# Patient Record
Sex: FEMALE | Race: WHITE
[De-identification: names, ages, dates, MRNs, and addresses within clinical notes are randomized per-mention and may not be internally consistent; named-entity substitution may affect disease eponyms.]

---

## 2019-06-18 ENCOUNTER — HOSPITAL ENCOUNTER (OUTPATIENT)
Dept: HOSPITAL 88 - OR | Age: 74
Discharge: HOME | End: 2019-06-18
Attending: OPHTHALMOLOGY
Payer: MEDICARE

## 2019-06-18 VITALS — DIASTOLIC BLOOD PRESSURE: 63 MMHG | SYSTOLIC BLOOD PRESSURE: 119 MMHG

## 2019-06-18 DIAGNOSIS — I10: ICD-10-CM

## 2019-06-18 DIAGNOSIS — R00.1: ICD-10-CM

## 2019-06-18 DIAGNOSIS — R73.9: ICD-10-CM

## 2019-06-18 DIAGNOSIS — E78.5: ICD-10-CM

## 2019-06-18 DIAGNOSIS — Z01.810: ICD-10-CM

## 2019-06-18 DIAGNOSIS — H25.11: Primary | ICD-10-CM

## 2019-06-18 PROCEDURE — 66984 XCAPSL CTRC RMVL W/O ECP: CPT

## 2019-06-18 PROCEDURE — 93005 ELECTROCARDIOGRAM TRACING: CPT

## 2019-06-18 NOTE — XMS REPORT
Continuity of Care Document

                             Created on: 2017



TRICIA BEAUCHAMP

External Reference #: [id number]

: 1945

Sex: Female



Demographics







                          Address                   [id number] [place] [address] [place]  [address]

 

                          Home Phone                [phone]

 

                          Preferred Language        Unknown

 

                          Marital Status            Unknown

 

                          Yazdanism Affiliation     Unknown

 

                          Race                      Unknown

 

                          Ethnic Group              Unknown





Author







                          Author                    [facility]              Interface

 

                          Address                   Unknown

 

                          Phone                     Unavailable



                                                    



Problems

                    





                    Problem                            Status                            Onset Date     

                          Classification                            Date Reported       

                          Comments                            Source                    

 

                          Fluid level behind tympanic membrane                                              

                    2017                            Diagnosis                            2017

                                                        [facility]                   

 

 

                    Pharyngitis                                                                         

                    Problem                            2017                               

                                        [facility]                    



                                                                                
                       



Medications

                    





                    Medication                            Details                            Route      

                          Status                            Patient Instructions         

                          Ordering Provider                            Order Date           

                                        Source                    

 

                          Losartan Potassium 50 MG Oral Tablet                            losartan 50 mg tablet

                                                        Active                       

                                                                                                    

[facility]                    

 

                          Prednisone 20 MG Oral Tablet                            prednisone 20 mg tablet Take

 2 tablets every day by oral route with meals for 5 days.                       
                                                Active                                                 

                                                                            [facility]              

      

 

                                        24 HR metoprolol succinate 50 MG Extended Release Oral Tablet [Toprol]          

                          Toprol XL 50 mg tablet,extended release                            

                            Active                                                   

                                                                            [facility]                

    

 

                          Losartan Potassium 100 MG Oral Tablet                            losartan 100 mg tablet

                                                        Active                       

                                                                                                    

[facility]                    

 

                          tizanidine 4 MG Oral Tablet                            tizanidine 4 mg tablet     

                                                   Active                              

                                                                                  [facility]

                    

 

                          tramadol hydrochloride 50 MG Oral Tablet                            tramadol 50 mg

 tablet                                                        Active                

                                                                                               

                                        [facility]                    

 

                          Azithromycin 250 MG Oral Tablet                            Zithromax Z-Mehrdad 250 mg 

tablet TAKE 2 TABLETS (500 MG) BY ORAL ROUTE ONCE DAILY FOR 1 DAY THEN 1 TABLET 
(250 MG) BY ORAL ROUTE ONCE DAILY FOR 4 DAYS                                       

                    Active                                                                

                                                      [facility]                    



                                                                                
                                                                                
                        



Allergies, Adverse Reactions, Alerts

                    





                    Substance                            Category                            Reaction   

                          Severity                            Reaction type           

                          Status                            Date Reported                     

                          Comments                            Source                    



                                                                



Immunizations

                    





                    Immunization                            Date Given                            Site  

                          Status                            Last Updated             

                          Comments                            Source                    

 

                          influenza, unspecified formulation                            2016          

                                                completed                                

                                                      [facility]                    

 

                          pneumococcal conjugate PCV 13                            2016               

                                                completed                                     

                                                      [facility]                    

 

                    zoster                            2012                                        

                    completed                                                              

                                        [facility]                    



                                                                                
                                        



Results

                    





                    Order Name                            Results                            Value      

                          Reference Range                            Date                

                          Interpretation                            Comments                       

                                        Source                    

 

                                                RESULT                            negative              

                                                2016                                 

                                                      [facility]                    

 

                                                SWAB LOCATION                            Left and Right 

tonsillar pillars                                                          2016

                                                                                    [facility]

                    



                                                                                
                                



Vital Signs

                    





                    Vital Sign                            Value                            Date         

                          Comments                            Source                    

 

                    Diastolic (mm Hg)                            78                             2017

                                                        [facility]                   

 

 

                    Height                            66                             2017         

                                                      [facility]                    

 

                    Systolic (mm Hg)                            122                             2017

                                                        [facility]                   

 

 

                    Weight                            170                             2017        

                                                      [facility]                    

 

                    Diastolic (mm Hg)                            76                             2016

                                                        [facility]                   

 

 

                    Height                            66                             2016         

                                                      [facility]                    

 

                    Systolic (mm Hg)                            122                             2016

                                                        [facility]                   

 

 

                    Weight                            170                             2016        

                                                      [facility]                    



                                                                                
                                                                                
                                        



Encounters

                    





                    Location                            Location Details                            Encounter

 Type                            Encounter Number                            Reason For

 Visit                            Attending Provider                            ADM Date

                            DC Date                            Status                

                                        Source                    

 

                          [place] - [facility] - [id number] [place]: [address], [place] [address], Ph. [phone]                                    [id number]               

                                                Reema Anthony                             2016

                                                                                    [facility]

                    

 

                          [place] - [facility] - [id number]                                                

                                        Traci Zapata, ELIASP: [address], [place] [address], Ph. [phone]

                                        [id number]                   

                                                Traci Zapata                             2017  

                                                                                  [facility]

                    



                                                                                
                



Procedures

                    





                    Procedure                            Code                            Date           

                          Perfomer                            Comments                        

                                        Source

## 2019-06-18 NOTE — XMS REPORT
Summary of Care

                             Created on: 2019



TRICIA BEAUCHAMP

External Reference #: [id number]

: 1945

Sex: Female



Demographics







                          Address                   [address] [place] [address] [place]  [address]

 

                          Preferred Language        English

 

                          Marital Status            Unknown

 

                          Zoroastrian Affiliation     Unknown

 

                          Race                      White

 

                          Ethnic Group              Non-





Author







                          Author                    PRIETO MCFARLAND M.D.

 

                          Organization              Unknown

 

                          Address                   Unknown

 

                          Phone                     Unavailable







Care Team Providers







                    Care Team Member Name    Role                Phone

 

                    PRIETO MCFARLAND M.D.    Unavailable         Unavailable

 

                    ROQUE LOGAN M.D.    Unavailable         Unavailable

 

                    MARY BORRERO M.D.    Unavailable         Unavailable

 

                    NAHOMI BIGGS MD    Unavailable         Unavailable

 

                          Unavailable               Unavailable







Functional Status







                    Name                Dates               Details

 

                                        Functional status health issues are not documented

                                                    Status: 









                    Name                Dates               Details

 

                                        Cognitive status health issues are not documented

                                                    Status: 







Problems







                    Name                Dates               Details

 

                                        Essential (primary) hypertension (401.9, I10) 

                                                    Status: Active

 

                                        Hyperlipidemia (272.4, E78.5) 

                                                    Status: Active







Medications







                    Name                Dates               Details

 

                                        Co Q 10 100 MG Oral Capsule

qd



 









Active

Fish Oil 1000 MG Oral Capsule

TAKE 1 CAPSULE DAILY.

* 





                                         Refills: 0







Active

Vitamin C 1000 MG Oral Tablet

TAKE 1 TABLET DAILY.

* 





                                         Refills: 0







Active

Calcium 600 MG Oral Tablet

TAKE 1 TABLET DAILY.

* 





                                         Refills: 0







Active

Multivitamins TABS

TAKE 1 TABLET DAILY.

* 





                                         Refills: 0







Active

Toprol XL 50 MG Oral Tablet Extended Release 24 Hour

TAKE 1 TABLET BEDTIME

* 





                           Quantity: 90              Refills: 2









PRIETO MCFARLAND M.D. * 





                                         Start : 18-Aug-2014





Active

Losartan Potassium 50 MG Oral Tablet

Take 1 tablet by mouth twice a day

* 





                           Quantity: 180             Refills: 0









PRIETO MCFARLAND M.D. * 





                                         Start : 18-Mar-2019





Active

Nitrostat 0.4 MG Sublingual Tablet Sublingual

DISSOLVE 1 TABLET UNDER THE TONGUE AS NEEDED FOR CHEST PAIN.

* 





                           Quantity: 25              Refills: 1









MARY BORRERO M.D. * 





                                         Start : 16-Oct-2014





Active

Simvastatin 20 MG Oral Tablet

TAKE 1 TABLET AT BEDTIME

* 





                           Quantity: 90              Refills: 1









ROQUE LOGAN M.D. * 





                                         Start : 2019





Active

Aspirin 81 MG Oral Tablet Delayed Release

TAKE 1 TABLET BY MOUTH EVERY DAY

* 





                           Quantity: 30              Refills: 0









ROQUE LOGAN M.D. * 





                                         Start : 2-May-2018





Active

Colestipol HCl - 1 GM Oral Tablet

Micronized ) TAKE 2 TABLET TWICE DAILY.

* 





                           Quantity: 360             Refills: 0









PRIETO MCFARLAND M.D. * 





                                         Start : 5-Sep-2018





Active





Allergies and Adverse Reactions







                    Name                Dates               Details

 

                    No Known Drug Allergies (Allergy)                        Status: Active









Past Medical History







                    Name                Dates               Details

 

                                        History of essential hypertension (V12.59, Z86.79) 

                                                    Status: Resolved

 

                                        History of hyperlipidemia (V12.29, Z86.39) 

                                                    Status: Resolved

 

                                        History of Intercostal pain (786.59, R07.82) 

                                                    Status: Resolved







Procedures







                    Procedure           Dates               Details

 

                    History of Hysterectomy                        Completed 



 

                    History of Cholecystectomy                        Completed 









Immunization







                    Name                Dates               Details

 

                                        Immunizations not documented

                                                     







Family History







                    Name                Dates               Details

 

                                        Family history of Hypertension (V17.49) 

                                                    Comments: Family History

Status: Active









                    Name                Dates               Details

 

                                        Family history of Diabetes Mellitus (V18.0) 

                                                    Status: Active







Social History







                    Name                Dates               Details

 

                                        -

                                                    Status: 









                    Name                Dates               Details

 

                    Never smoker                             







Vital Signs







                Date            Test            Result          Details

 

                                                                 

 

                                        1-May-201910:49

                    BP Systolic         133 mm[Hg]          Status: Comments: Location: LUE; Position: Sitting



 

                    BP Diastolic        79 mm[Hg]           Status: Comments: Location: LUE; Position: Sitting



 

                    Height              60 in               Status: 



 

                    Weight              175 lb              Status: 



 

                    Body Mass Index Calculated    34.18 kg/m2         Status: 



 

                    Body Surface Area Calculated    1.76 m2             Status: 



 

                    Heart Rate          57 /min             Status: 









Results







                Date            Description     Value           Details

 

                    Results not documented                         

 

                                                                 







Plan of Care







                    Name                Dates               Details

 

                                        Planned Observations 

 

                    Planned Goals not documented                         

 

                                        Planned Encounters 

 

                                        Appointment; PRIETO MCFARLAND M.D.

                                        On: 10-Oct-2019 9:30









Interventions Provided

Plan* 1. Hypertension 

* - Continue Toprol 50mg daily. Continue Losartan 50mg HS --> BID w/ improved 
  control  

* 2. Hyperlipidemia 

* - Continue cholestyramine, diet and exercise 

* -  mg/dl as  

* -  mg /dl as  --> - up on Zocor 20 mg  

* - LDL 82 ,g/dl as 4.9 on Zocor 20 + colestipol  

* 3.DM 

* - 1ac 6.1% as  

* 4. Follow up in 6 months, regular

Discussion/Summary* Clinical cardiac findings reviewed and discussed 

* EKG reviewed and discussed





Instructions







                    Name                Dates               Details

 

                                        Instructions not documented

                                                     







Encounters







                                        Appointment; PRIETO MCFARLAND M.D. 

Encounter Diagnosis: Problem not documented

                                        On: 16-Oct-2017 9:30



 

                                        Appointment; PRIETO MCFARLAND M.D. 

Encounter Diagnosis: Problem not documented

                                        On: 24-Oct-2017 9:30



 

                                        Appointment; PRIETO MCFARLAND M.D. 

Encounter Diagnosis: Problem not documented

                                        On: 2-May-2018 13:30



 

                                        Appointment; PRIETO MCFARLAND M.D. 

Encounter Diagnosis: Problem not documented

                                        On: 30-Oct-2018 10:30



 

                                        Appointment; PRIETO MCFARLAND M.D. 

Encounter Diagnosis: Problem not documented

                                        On: 1-May-2019 10:30

## 2019-06-18 NOTE — XMS REPORT
Encounter Summary

                             Created on: 2017



Monika Beckett

External Reference #: [id number]

: 1945

Sex: Female



Demographics







                          Address                   [address] [place]  [address]

 

                          Home Phone                [phone]

 

                          Preferred Language        Unknown

 

                          Marital Status            

 

                          Gnosticism Affiliation     Unknown

 

                          Race                      Unknown

 

                          Ethnic Group              Unknown





Author







                          Organization              Unknown

 

                          Address                   [address] [place]  [address]



 

                          Phone                     [phone]



                                                      



Reason for Visit

                      





                                        Medical Complaint                



                                                                              



Instructions

          





                                        1. Fluid level behind tympanic membrane

 

                                         prednisone 20 mg tablet







Discussion Note: None recorded.



Patient educational handouts: No information available.                         
                                                    



Plan of Care

                      





                Reminders                                                                    Provider 

               

 

                Appointments    None recorded.                   

 

                Lab             None recorded.                   

 

                Referral        None recorded.                   

 

                Procedures      None recorded.                   

 

                Surgeries       None recorded.                   

 

                Imaging         None recorded.                   



                                                                              



Medications

                      





                    Name                      Start Date                                      

 

                          losartan 50 mg tablet     

 

                                        prednisone 20 mg tablet

 Take 2 tablets every day by oral route with meals for 5 days.    

 

                          Toprol XL 50 mg tablet,extended release    



                                                                                
                           



Medications Administered

          



  None recorded.                                                                
               



Vitals

          





                Height          Weight          BMI             Blood Pressure 

 

                 5 ft 6 in        170 lbs         27.4            122/78  



                                                                              



Lab Results

                      



              None recorded.                                                    
                                               



Allergies

          





           Code       Code System    Name       Reaction    Severity    Onset

 

                NKDA                                          



                                                                              



Problems

                      





                Name                      Status                      Onset Date                      

Source                                                  

 

                Pharyngitis     Active                         Encounter



                                                                                
       



Procedures

          



None recorded.                                                                  
           



Vaccine List

          





                                        Vaccine Type

 

                                        influenza, unspecified formulation

 

                                        10/31/2016

 

                                        pneumococcal conjugate PCV 13

 

                                        2015

 

                                        zoster

 

                                        2011



                                                                                
                 



Social History

          





                    Smoking Status      Never Smoker         



                                                                              



Past Encounters

                      





                                        2017

Fluid Level behind Tympanic Membrane

Traci Zapata, FNP: [address], [place] [address], Ph. [phone]



                                                                                
       



History of Present Illness

          



Note:72y/o female here with muffled ear to left for 2 weeks. Denies ear pain. No
associated sx. Has tried claritin.                                              
                     



Review of Systems

                      





                                                   Basic

 

                          Reported By:              Patient

 

                          Constitutional:           Constitutional: no fever

 

                          Eyes:                     Eyes: no eye complaints

 

                          Ears-Nose-Mouth-Throat:    Ears: difficulty hearing. Nose: no nose/sinus problems.

 Mouth/Throat: no sore throat, no bleeding gums, no mouth complaints, no teeth 
problems

 

                          Cardiovascular:           Cardiovascular: no chest pain, no shortness of breath, no known

 heart murmur

 

                          Respiratory:              Respiratory: no cough, no wheezing, no shortness of breath

 

                          Gastrointestinal:         Gastrointestinal: no abdominal pain, no vomiting / diarrhea

 

                          Genitourinary:            Genitourinary: no urinary complaints, no discharge

 

                          Musculoskeletal:          Musculoskeletal: no muscle aches, no muscle weakness, no arthralgias/joint

 pain, no back pain

 

                          Skin:                     Skin: no abnormal / changing mole, no jaundice, no rashes

 

                          Neurologic:               Neurologic: no loss of consciousness, no weakness, no numbness, no 

seizures, no dizziness, no headaches



                                                                              



Physical Exam

                      





                                                   Adult Basic, Adult Female Complete

 

                          Reported By:              Patient

 

                          Constitutional:           General Appearance: healthy-appearing, well-nourished, well-developed.

 Level of Distress: NAD. Ambulation: ambulating normally

 

                          Psychiatric:              Mental Status: active and alert. Orientation: to time, to place, to

 person

 

                          Ear-Nose-Mouth-Throat:    Ears: no lesions on external ear, no outer ear tenderness,

 EACs clear, TMs clear, middle ear fluid. Hearing: no hearing loss. Nose: no 
lesions on external nose, nares patent, no septal deviation, nasal passages 
clear, no sinus tenderness, no nasal discharge. Lips, Teeth, and Gums: no mouth 
or lip ulcers, no bleeding gums, normal dentition. Oropharynx: moist mucous 
membranes, no erythema, no exudates, tonsils not enlarged

 

                          Lungs:                    Respiratory effort: no dyspnea, no tachypnea, no use of accessory muscles,

 no intercostal retractions. Percussion: no dullness, flatness, or 
hyperresonance. Auscultation: breath sounds normal

 

                          Cardiovascular:           Heart Auscultation: RRR, no murmurs. Neck vessels: no carotid bruits.

 Pulses including femoral / pedal: normal throughout

## 2019-07-02 ENCOUNTER — HOSPITAL ENCOUNTER (OUTPATIENT)
Dept: HOSPITAL 88 - OR | Age: 74
Discharge: HOME | End: 2019-07-02
Attending: OPHTHALMOLOGY
Payer: MEDICARE

## 2019-07-02 VITALS — DIASTOLIC BLOOD PRESSURE: 53 MMHG | SYSTOLIC BLOOD PRESSURE: 132 MMHG

## 2019-07-02 DIAGNOSIS — E78.5: ICD-10-CM

## 2019-07-02 DIAGNOSIS — R73.9: ICD-10-CM

## 2019-07-02 DIAGNOSIS — I10: ICD-10-CM

## 2019-07-02 DIAGNOSIS — H25.12: Primary | ICD-10-CM

## 2019-07-02 PROCEDURE — 66984 XCAPSL CTRC RMVL W/O ECP: CPT

## 2019-07-02 NOTE — XMS REPORT
Summary of Care

                             Created on: 2019



TRICIA BEAUCHAMP

External Reference #: [id number]

: 1945

Sex: Female



Demographics







                          Address                   [id number] [place] [address] [place]  [address]

 

                          Preferred Language        English

 

                          Marital Status            Unknown

 

                          Yazidism Affiliation     Unknown

 

                          Race                      White

 

                          Ethnic Group              Non-





Author







                          Author                    Jolanta Martin M.A.

 

                          Organization              Unknown

 

                          Address                   [facility]



 

                          Phone                     Unavailable







Care Team Providers







                    Care Team Member Name    Role                Phone

 

                    PRIETO MCFARLAND M.D.    Unavailable         Unavailable

 

                    ROQUE LOGAN M.D.    Unavailable         Unavailable

 

                    Jolanta Martin M.A.    Unavailable         Unavailable

 

                    MARY BORRERO M.D.    Unavailable         Unavailable

 

                    NAHOMI BIGGS MD    Unavailable         Unavailable

 

                          Unavailable               Unavailable







Functional Status







                    Name                Dates               Details

 

                                        Functional status health issues are not documented

                                                    Status: 









                    Name                Dates               Details

 

                                        Cognitive status health issues are not documented

                                                    Status: 







Problems







                    Name                Dates               Details

 

                                        Essential (primary) hypertension (401.9, I10) 

                                                    Status: Active

 

                                        Hyperlipidemia (272.4, E78.5) 

                                                    Status: Active







Medications







                    Name                Dates               Details

 

                                        Co Q 10 100 MG Oral Capsule

qd



 









Active

Fish Oil 1000 MG Oral Capsule

TAKE 1 CAPSULE DAILY.

* 





                                         Refills: 0







Active

Vitamin C 1000 MG Oral Tablet

TAKE 1 TABLET DAILY.

* 





                                         Refills: 0







Active

Calcium 600 MG Oral Tablet

TAKE 1 TABLET DAILY.

* 





                                         Refills: 0







Active

Multivitamins TABS

TAKE 1 TABLET DAILY.

* 





                                         Refills: 0







Active

Toprol XL 50 MG Oral Tablet Extended Release 24 Hour

TAKE 1 TABLET BEDTIME

* 





                           Quantity: 90              Refills: 2









PRIETO MCFARLAND M.D. * 





                                         Start : 18-Aug-2014





Active

Losartan Potassium 50 MG Oral Tablet

Take 1 tablet by mouth twice a day

* 





                           Quantity: 180             Refills: 0









PRIETO MCFARLAND M.D. * 





                                         Start : 18-Mar-2019





Active

Nitrostat 0.4 MG Sublingual Tablet Sublingual

DISSOLVE 1 TABLET UNDER THE TONGUE AS NEEDED FOR CHEST PAIN.

* 





                           Quantity: 25              Refills: 1









MARY BORRERO M.D. * 





                                         Start : 16-Oct-2014





Active

Simvastatin 20 MG Oral Tablet

TAKE 1 TABLET AT BEDTIME

* 





                           Quantity: 90              Refills: 1









ROQUE LOGAN M.D. * 





                                         Start : 2019





Active

Aspirin 81 MG Oral Tablet Delayed Release

TAKE 1 TABLET BY MOUTH EVERY DAY

* 





                           Quantity: 30              Refills: 0









ROQUE LOGAN M.D. * 





                                         Start : 2-May-2018





Active

Colestipol HCl - 1 GM Oral Tablet

Micronized ) TAKE 2 TABLET TWICE DAILY.

* 





                           Quantity: 360             Refills: 1









PRIETO MCFARLAND M.D. * 





                                         Start : 5-Sep-2018





Active





Allergies and Adverse Reactions







                    Name                Dates               Details

 

                    No Known Drug Allergies (Allergy)                        Status: Active









Past Medical History







                    Name                Dates               Details

 

                                        History of essential hypertension (V12.59, Z86.79) 

                                                    Status: Resolved

 

                                        History of hyperlipidemia (V12.29, Z86.39) 

                                                    Status: Resolved

 

                                        History of Intercostal pain (786.59, R07.82) 

                                                    Status: Resolved







Procedures







                    Procedure           Dates               Details

 

                    History of Hysterectomy                        Completed 



 

                    History of Cholecystectomy                        Completed 









Immunization







                    Name                Dates               Details

 

                                        Immunizations not documented

                                                     







Family History







                    Name                Dates               Details

 

                                        Family history of Hypertension (V17.49) 

                                                    Comments: Family History

Status: Active









                    Name                Dates               Details

 

                                        Family history of Diabetes Mellitus (V18.0) 

                                                    Status: Active







Social History







                    Name                Dates               Details

 

                                        -

                                                    Status: 









                    Name                Dates               Details

 

                    Never smoker                             







Vital Signs







                Date            Test            Result          Details

 

                                                                 

 

                    No Known Vitals to report                         







Results







                Date            Description     Value           Details

 

                    Results not documented                         

 

                                                                 







Plan of Care







                    Name                Dates               Details

 

                                        Planned Observations 

 

                    Planned Goals not documented                         

 

                                        Planned Encounters 

 

                                        Appointment; PRIETO MCFARLAND M.D.

                                        On: 10-Oct-2019 9:30









Interventions Provided

Medication Changes* Colestipol HCl - 1 GM Oral Tablet - Renew





Instructions







                    Name                Dates               Details

 

                                        Instructions not documented

                                                     







Encounters







                                        Appointment; PRIETO MCFARLAND M.D. 

Encounter Diagnosis: Problem not documented

                                        On: 16-Oct-2017 9:30



 

                                        Appointment; PRIETO MCFARLAND M.D. 

Encounter Diagnosis: Problem not documented

                                        On: 24-Oct-2017 9:30



 

                                        Appointment; PRIETO MCFARLAND M.D. 

Encounter Diagnosis: Problem not documented

                                        On: 2-May-2018 13:30



 

                                        Appointment; PRIETO MCFARLAND M.D. 

Encounter Diagnosis: Problem not documented

                                        On: 30-Oct-2018 10:30



 

                                        Appointment; PRIETO MCFARLAND M.D. 

Encounter Diagnosis: Problem not documented

                                        On: 1-May-2019 10:30

## 2019-07-02 NOTE — XMS REPORT
Continuity of Care Document

                             Created on: 2019



TRICIA BEAUCHAMP

External Reference #: [id number]

: 1945

Sex: Female



Demographics







                          Address                   [address] [place] [address] [place]  [address]

 

                          Home Phone                [phone]

 

                          Preferred Language        English

 

                          Marital Status            Unknown

 

                          Mandaeism Affiliation     Unknown

 

                          Race                      Unknown

 

                          Ethnic Group              Unknown





Author







                          Author                    [facility]

 

                          [facility]              [facility]

 

                          Address                   Unknown

 

                          Phone                     Unavailable







Care Team Providers







                    Care Team Member Name    Role                Phone

 

                    [facility] [employer] Pianpian    Unavailable         Unavailable



                                    



Problems

                    





                    Problem                            Status                            Onset Date     

                          Classification                            Date Reported       

                          Comments                            Source                    

 

                          Fluid level behind tympanic membrane                                              

                    2017                            Diagnosis                            2017

                                                        [facility]                   

 

 

                    Pharyngitis                                                                         

                    Problem                            2017                               

                                        [facility]                    



                                                                                
       



Medications

                    





                    Medication                            Details                            Route      

                          Status                            Patient Instructions         

                          Ordering Provider                            Order Date           

                                        Source                    

 

                          Losartan Potassium 100 MG Oral Tablet                            losartan 100 mg tablet

                                                        Active                       

                                                                                                    

[facility]                    

 

                          Losartan Potassium 50 MG Oral Tablet                            losartan 50 mg tablet

                                                        Active                       

                                                                                                    

[facility]                    

 

                          tizanidine 4 MG Oral Tablet                            tizanidine 4 mg tablet     

                                                   Active                              

                                                                                  [facility]

                    

 

                                        24 HR metoprolol succinate 50 MG Extended Release Oral Tablet [Toprol]          

                          Toprol XL 50 mg tablet,extended release                            

                            Active                                                   

                                                                            [facility]                

    

 

                          tramadol hydrochloride 50 MG Oral Tablet                            tramadol 50 mg

 tablet                                                        Active                

                                                                                               

                                        [facility]                    

 

                          Azithromycin 250 MG Oral Tablet                            Zithromax Z-Mehrdad 250 mg 

tablet TAKE 2 TABLETS (500 MG) BY ORAL ROUTE ONCE DAILY FOR 1 DAY THEN 1 TABLET 
(250 MG) BY ORAL ROUTE ONCE DAILY FOR 4 DAYS                                       

                    Active                                                                

                                                      [facility]                    

 

                          Prednisone 20 MG Oral Tablet                            prednisone 20 mg tablet Take

 2 tablets every day by oral route with meals for 5 days.                       
                                                Active                                                

                                                                            [facility]             

       



                                                                                
                                                                                
                    



Allergies, Adverse Reactions, Alerts

        





                                        No Known Medication Allergies                      



                                                        



Immunizations

                    





                    Immunization                            Date Given                            Site  

                          Status                            Last Updated             

                          Comments                            Source                    

 

                          influenza, unspecified formulation                            2016          

                                                completed                                

                                                      [facility]                    

 

                          pneumococcal conjugate PCV 13                            2016               

                                                completed                                     

                                                      [facility]                    

 

                    zoster                            2012                                        

                    completed                                                              

                                        [facility]                    



                                                                            



Results







                    Order Name                            Results                            Value      

                          Reference Range                            Date                

                          Interpretation                            Comments                       

                                        Source                    

 

                                            RESULT          negative                                        

                    2016                                                             

                                        [facility]                    

 

                                                SWAB LOCATION       Left and Right tonsillar pillars       

                                                 2016                            

                                                        [facility]                   

 



                              



Pathology Reports







                                        No Data Provided for This Section                    



                            



Diagnostic Reports

            





                                        No Data Provided for This Section                    



                                                            



Consultation Notes

                    





                                        No Data Provided for This Section                    



                                                            



Discharge Summaries

                    





                                        No Data Provided for This Section                    



                                                            



History and Physicals

                    





                                        No Data Provided for This Section                    



                                                                



Vital Signs

                     





                    Vital Sign                            Value                            Date         

                          Comments                            Source                    

 

                    Diastolic (mm Hg)                            78                             2017

                                                        [facility]                   

 

 

                    Height                            66                             2017         

                                                      [facility]                    

 

                    Systolic (mm Hg)                            122                             2017

                                                        [facility]                   

 

 

                    Weight                            170                             2017        

                                                      [facility]                    

 

                    Diastolic (mm Hg)                            76                             2016

                                                        [facility]                   

 

 

                    Height                            66                             2016         

                                                      [facility]                    

 

                    Systolic (mm Hg)                            122                             2016

                                                        [facility]                   

 

 

                    Weight                            170                             2016        

                                                      [facility]                    



                                                                                
                                                                                
                                        



Encounters

                    





                    Location                            Location Details                            Encounter

 Type                            Encounter Number                            Reason For

 Visit                            Attending Provider                            ADM Date

                            DC Date                            Status                

                                        Source                    

 

                          [place] - [facility] - [id number]aines[facility]: [address], [place] [address], Ph. [phone]

[phone]                                    [id number]               

                                                Reema Anthony                             2016

                                                                                    [facility]

                    

 

                          [place] - [facility] - [id number]                                                

                                        Traci Zapata, ELIASP: [address], [place] [address], Ph. [phone]

                                        [id number]                   

                                                Traci Zapata                             2017  

                                                                                  [facility]

                    



                                                                                
    



Procedures

        





                                        No Data Provided for This Section



                                                    



Assessment and Plan

                    





                                        No Data Provided for This Section                    



                                     



Plan of Care







                                        No Data Provided for This Section                    



                                                                



Social History

                    





                    Social History                            Date                            Source    

                

 

                                                                                    Smoking Status

             Never Smoker

                                   

                                 2016                            [facility]    

                



                                                                    



Family History

                    





                                        No Data Provided for This Section                    



                                                            



Advance Directives

                    





                                        No Data Provided for This Section                    



                                                            



Functional Status

                    





                                        No Data Provided for This Section